# Patient Record
(demographics unavailable — no encounter records)

---

## 2025-07-30 NOTE — ASSESSMENT
[FreeTextEntry1] : I reviewed at length with the patient and his son-in-law the severity of the underlying pathology and problem persistently given the chronicity and recommendation is time for evaluation with moderate dedicated knee arthroplasty surgeons to discuss possible treatment options to improve his range of motion discussed with him that at this point he is demonstrating significant arthrofibrosis but an opinion from one of our joint replacement surgeons may provide consideration to other mechanisms to improve his symptoms possible consideration as well to dynamic splinting to increase his range of motion but I discussed that this may not significantly be of effect

## 2025-07-30 NOTE — HISTORY OF PRESENT ILLNESS
[de-identified] : Initial visit: Left knee pain  Reason: Developed scar tissue after operation  Duration: 12/ 2024 Prior studies: x rays ordered  Symptoms: Locking / sharp  Aggravating Fx: prolonged sitting/  Alleviating Fx: Pain level: 6/10 Pain medication: Diclofac  Medical Hx: Surgical Hx:  TKR Left knee - 2002 Revision- 20212  Current Medication: Allergies: Morphine  Physical therapy -  Completed 2012 Patient is a new patient presenting with complaints of left knee stiffness and discomfort he underwent a total knee replacement in 2002 with revision and multiple manipulations under anesthesia but has had persistent stiffness since his revision with inability to flex the knee past 70 degrees

## 2025-07-30 NOTE — PHYSICAL EXAM
[de-identified] : Left knee  Constitutional:  The patient is healthy-appearing and in no apparent distress.   Gait: The patient ambulates with a mild limp  Cardiovascular System:  The capillary refill is less than 2 seconds.   Skin:  There are no skin abnormalities except diffuse swelling and a well-healed longitudinal midline scar  Left Knee:   Bony Palpation:  There is no tenderness of the medial joint line.  There is no tenderness of the lateral joint line. There is no tenderness of the medial femoral chondyle. There is no tenderness of the lateral femoral chondyle. There is no tenderness of the tibial tubercle. There is no tenderness of the superior patella. There is no tenderness of the inferior patella. There is tenderness of the medial patellar facet. There is tenderness of the lateral patellar facet.  Soft Tissue Palpation:  There is tenderness of the medial retinaculum. There is tenderness of the lateral retinaculum. There is no tenderness of the quadriceps tendon. There is no tenderness of the patella tendon. There is no tenderness of the ITB. There is no tenderness of the pes anserine.  Active Range of Motion:  The range of motion at the knee actively and passively is 0-70 with pain at end flexion  Strength:  There is 5/5 hip flexion and 5/5 knee flexion and extension.    Psychiatric:  The patient demonstrates a normal mood and affect and is active and alert  [de-identified] : Based on detailed discussion with history and physical examination of the patient, x-ray evaluation is recommended and ordered for treatment and diagnosis. 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

## 2025-07-30 NOTE — HISTORY OF PRESENT ILLNESS
[de-identified] : Initial visit: Left knee pain  Reason: Developed scar tissue after operation  Duration: 12/ 2024 Prior studies: x rays ordered  Symptoms: Locking / sharp  Aggravating Fx: prolonged sitting/  Alleviating Fx: Pain level: 6/10 Pain medication: Diclofac  Medical Hx: Surgical Hx:  TKR Left knee - 2002 Revision- 20212  Current Medication: Allergies: Morphine  Physical therapy -  Completed 2012 Patient is a new patient presenting with complaints of left knee stiffness and discomfort he underwent a total knee replacement in 2002 with revision and multiple manipulations under anesthesia but has had persistent stiffness since his revision with inability to flex the knee past 70 degrees

## 2025-07-30 NOTE — PHYSICAL EXAM
[de-identified] : Left knee  Constitutional:  The patient is healthy-appearing and in no apparent distress.   Gait: The patient ambulates with a mild limp  Cardiovascular System:  The capillary refill is less than 2 seconds.   Skin:  There are no skin abnormalities except diffuse swelling and a well-healed longitudinal midline scar  Left Knee:   Bony Palpation:  There is no tenderness of the medial joint line.  There is no tenderness of the lateral joint line. There is no tenderness of the medial femoral chondyle. There is no tenderness of the lateral femoral chondyle. There is no tenderness of the tibial tubercle. There is no tenderness of the superior patella. There is no tenderness of the inferior patella. There is tenderness of the medial patellar facet. There is tenderness of the lateral patellar facet.  Soft Tissue Palpation:  There is tenderness of the medial retinaculum. There is tenderness of the lateral retinaculum. There is no tenderness of the quadriceps tendon. There is no tenderness of the patella tendon. There is no tenderness of the ITB. There is no tenderness of the pes anserine.  Active Range of Motion:  The range of motion at the knee actively and passively is 0-70 with pain at end flexion  Strength:  There is 5/5 hip flexion and 5/5 knee flexion and extension.    Psychiatric:  The patient demonstrates a normal mood and affect and is active and alert  [de-identified] : Based on detailed discussion with history and physical examination of the patient, x-ray evaluation is recommended and ordered for treatment and diagnosis. 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